# Patient Record
Sex: FEMALE | Race: WHITE | NOT HISPANIC OR LATINO | URBAN - METROPOLITAN AREA
[De-identification: names, ages, dates, MRNs, and addresses within clinical notes are randomized per-mention and may not be internally consistent; named-entity substitution may affect disease eponyms.]

---

## 2021-04-13 ENCOUNTER — OFFICE VISIT (OUTPATIENT)
Dept: URBAN - METROPOLITAN AREA CLINIC 98 | Facility: CLINIC | Age: 36
End: 2021-04-13
Payer: COMMERCIAL

## 2021-04-13 VITALS
DIASTOLIC BLOOD PRESSURE: 90 MMHG | WEIGHT: 277 LBS | SYSTOLIC BLOOD PRESSURE: 149 MMHG | HEIGHT: 67 IN | HEART RATE: 68 BPM | TEMPERATURE: 96.9 F | BODY MASS INDEX: 43.47 KG/M2

## 2021-04-13 DIAGNOSIS — R16.0 HEPATOMEGALY: ICD-10-CM

## 2021-04-13 DIAGNOSIS — K76.9 LIVER LESION: ICD-10-CM

## 2021-04-13 DIAGNOSIS — K76.89 LIVER CYST: ICD-10-CM

## 2021-04-13 DIAGNOSIS — R10.11 RUQ PAIN: ICD-10-CM

## 2021-04-13 PROCEDURE — G8417 CALC BMI ABV UP PARAM F/U: HCPCS | Performed by: INTERNAL MEDICINE

## 2021-04-13 PROCEDURE — 1036F TOBACCO NON-USER: CPT | Performed by: INTERNAL MEDICINE

## 2021-04-13 PROCEDURE — G8483 FLU IMM NO ADMIN DOC REA: HCPCS | Performed by: INTERNAL MEDICINE

## 2021-04-13 PROCEDURE — 99214 OFFICE O/P EST MOD 30 MIN: CPT | Performed by: INTERNAL MEDICINE

## 2021-04-13 PROCEDURE — G8427 DOCREV CUR MEDS BY ELIG CLIN: HCPCS | Performed by: INTERNAL MEDICINE

## 2021-04-13 NOTE — HPI-TODAY'S VISIT:
Here on kind referral from Dr Anila Espinal at [facility] for abnormal liver MRI.   MRI 3/29/21 : slightly enlarged liver 20cm, cyst in seg 8 on R 7mm.  T2 hyperintense lesion in left lobe 4mm. patent vasculature. spleen normal. compared to prev MRI Jan 2020 : no change of cyst as well as suspected hemangioma. . side pain daily on right .  some days worse.  worse if she doesn't drink water. 4 bm/week lost  50 lbs, working out, changed diet - away from fried foods/ greasy . annual flu vaccination recommended

## 2021-04-13 NOTE — PHYSICAL EXAM CONSTITUTIONAL:
normal, alert, in no acute distress, well developed, well nourished,  normal communication ability; obese

## 2021-04-14 ENCOUNTER — CLAIMS CREATED FROM THE CLAIM WINDOW (OUTPATIENT)
Dept: URBAN - METROPOLITAN AREA CLINIC 4 | Facility: CLINIC | Age: 36
End: 2021-04-14
Payer: COMMERCIAL

## 2021-04-14 ENCOUNTER — OFFICE VISIT (OUTPATIENT)
Dept: URBAN - METROPOLITAN AREA SURGERY CENTER 18 | Facility: SURGERY CENTER | Age: 36
End: 2021-04-14
Payer: COMMERCIAL

## 2021-04-14 DIAGNOSIS — K31.89 FUNCTIONAL VOMITING: ICD-10-CM

## 2021-04-14 DIAGNOSIS — K29.60 OTHER GASTRITIS WITHOUT BLEEDING: ICD-10-CM

## 2021-04-14 DIAGNOSIS — K31.9 DISEASE OF STOMACH AND DUODENUM, UNSPECIFIED: ICD-10-CM

## 2021-04-14 DIAGNOSIS — K21.00 GASTRO-ESOPHAGEAL REFLUX DISEASE WITH ESOPHAGITIS, WITHOUT BLEEDING: ICD-10-CM

## 2021-04-14 DIAGNOSIS — K31.89 ACQUIRED DEFORMITY OF DUODENUM: ICD-10-CM

## 2021-04-14 DIAGNOSIS — K21.9 ACID REFLUX: ICD-10-CM

## 2021-04-14 PROCEDURE — G8907 PT DOC NO EVENTS ON DISCHARG: HCPCS | Performed by: INTERNAL MEDICINE

## 2021-04-14 PROCEDURE — 43239 EGD BIOPSY SINGLE/MULTIPLE: CPT | Performed by: INTERNAL MEDICINE

## 2021-04-14 PROCEDURE — 88312 SPECIAL STAINS GROUP 1: CPT | Performed by: PATHOLOGY

## 2021-04-14 PROCEDURE — 88305 TISSUE EXAM BY PATHOLOGIST: CPT | Performed by: PATHOLOGY

## 2021-04-19 ENCOUNTER — TELEPHONE ENCOUNTER (OUTPATIENT)
Dept: URBAN - METROPOLITAN AREA CLINIC 98 | Facility: CLINIC | Age: 36
End: 2021-04-19

## 2021-04-19 RX ORDER — PANTOPRAZOLE SODIUM 40 MG/1
1 TABLET TABLET, DELAYED RELEASE ORAL ONCE A DAY
Qty: 90 TABLET | Refills: 1 | OUTPATIENT

## 2021-04-23 ENCOUNTER — TELEPHONE ENCOUNTER (OUTPATIENT)
Dept: URBAN - METROPOLITAN AREA CLINIC 96 | Facility: CLINIC | Age: 36
End: 2021-04-23

## 2021-05-10 ENCOUNTER — LAB OUTSIDE AN ENCOUNTER (OUTPATIENT)
Dept: URBAN - METROPOLITAN AREA CLINIC 98 | Facility: CLINIC | Age: 36
End: 2021-05-10

## 2021-06-04 ENCOUNTER — OFFICE VISIT (OUTPATIENT)
Dept: URBAN - METROPOLITAN AREA CLINIC 98 | Facility: CLINIC | Age: 36
End: 2021-06-04

## 2022-01-07 ENCOUNTER — WEB ENCOUNTER (OUTPATIENT)
Dept: URBAN - METROPOLITAN AREA CLINIC 98 | Facility: CLINIC | Age: 37
End: 2022-01-07

## 2022-01-07 ENCOUNTER — OFFICE VISIT (OUTPATIENT)
Dept: URBAN - METROPOLITAN AREA CLINIC 98 | Facility: CLINIC | Age: 37
End: 2022-01-07
Payer: COMMERCIAL

## 2022-01-07 ENCOUNTER — DASHBOARD ENCOUNTERS (OUTPATIENT)
Age: 37
End: 2022-01-07

## 2022-01-07 VITALS
HEIGHT: 67 IN | DIASTOLIC BLOOD PRESSURE: 82 MMHG | TEMPERATURE: 97.1 F | BODY MASS INDEX: 35.19 KG/M2 | HEART RATE: 64 BPM | WEIGHT: 224.2 LBS | SYSTOLIC BLOOD PRESSURE: 138 MMHG

## 2022-01-07 DIAGNOSIS — K76.9 LIVER LESION: ICD-10-CM

## 2022-01-07 DIAGNOSIS — K76.89 LIVER CYST: ICD-10-CM

## 2022-01-07 DIAGNOSIS — R16.0 HEPATOMEGALY: ICD-10-CM

## 2022-01-07 DIAGNOSIS — R10.84 GENERALIZED ABDOMINAL PAIN: ICD-10-CM

## 2022-01-07 PROBLEM — 300331000: Status: ACTIVE | Noted: 2021-04-12

## 2022-01-07 PROBLEM — 85057007: Status: ACTIVE | Noted: 2021-04-12

## 2022-01-07 PROCEDURE — 99214 OFFICE O/P EST MOD 30 MIN: CPT | Performed by: INTERNAL MEDICINE

## 2022-01-07 RX ORDER — PANTOPRAZOLE SODIUM 40 MG/1
1 TABLET TABLET, DELAYED RELEASE ORAL ONCE A DAY
Qty: 90 TABLET | Refills: 1 | Status: ON HOLD | COMMUNITY

## 2022-01-07 NOTE — HPI-TODAY'S VISIT:
started working out and has a  - 5 d a week. changed diet.   lost 100 lbs in a year.   not yet at goal weight but happy where she is now  wasn't feeling well 2/2 COVID in December. not regular BM now - painful as she is having to push bm out.  hard stools  took mag citrate but still fatou and she felt dehydrated  went to [facility] : got ivf  having pelvic pain  GYN : did US - possible enlarged ; fish oil, vit C, vit D, natural herb.